# Patient Record
Sex: FEMALE | Race: WHITE | NOT HISPANIC OR LATINO | Employment: OTHER | URBAN - NONMETROPOLITAN AREA
[De-identification: names, ages, dates, MRNs, and addresses within clinical notes are randomized per-mention and may not be internally consistent; named-entity substitution may affect disease eponyms.]

---

## 2024-11-09 ENCOUNTER — OFFICE VISIT (OUTPATIENT)
Dept: URGENT CARE | Facility: CLINIC | Age: 79
End: 2024-11-09
Payer: COMMERCIAL

## 2024-11-09 VITALS
SYSTOLIC BLOOD PRESSURE: 124 MMHG | OXYGEN SATURATION: 98 % | TEMPERATURE: 98 F | HEART RATE: 98 BPM | DIASTOLIC BLOOD PRESSURE: 92 MMHG | RESPIRATION RATE: 18 BRPM

## 2024-11-09 DIAGNOSIS — H00.14 CHALAZION LEFT UPPER EYELID: Primary | ICD-10-CM

## 2024-11-09 PROCEDURE — S9088 SERVICES PROVIDED IN URGENT: HCPCS

## 2024-11-09 PROCEDURE — 99213 OFFICE O/P EST LOW 20 MIN: CPT

## 2024-11-09 RX ORDER — PANTOPRAZOLE SODIUM 40 MG/1
40 TABLET, DELAYED RELEASE ORAL DAILY
COMMUNITY
Start: 2024-05-31

## 2024-11-09 RX ORDER — TOBRAMYCIN AND DEXAMETHASONE 3; 1 MG/ML; MG/ML
1 SUSPENSION/ DROPS OPHTHALMIC EVERY 6 HOURS
Qty: 5 ML | Refills: 0 | Status: SHIPPED | OUTPATIENT
Start: 2024-11-09 | End: 2024-11-16

## 2024-11-09 RX ORDER — TRIAMTERENE AND HYDROCHLOROTHIAZIDE 37.5; 25 MG/1; MG/1
1 CAPSULE ORAL DAILY
COMMUNITY
Start: 2024-05-31

## 2024-11-09 RX ORDER — LORAZEPAM 1 MG/1
1 TABLET ORAL DAILY
COMMUNITY
Start: 2024-06-12

## 2024-11-09 NOTE — PROGRESS NOTES
"  [facility]        NAME: Chrissy Matson is a 79 y.o. female  : 1945    MRN: [id number]  DATE: 2024  TIME: 10:54 AM    Assessment and Plan   Chalazion left upper eyelid [H00.14]  1. Chalazion left upper eyelid  tobramycin-dexamethasone (TOBRADEX) ophthalmic suspension        Chalazion of left upper eyelid  Pt requesting abx. Reasonable given some erythema of conjunctiva and swelling. Supportive care recommended     Patient Instructions   Warm moist compress. Use the drops as directed. If no resolution in 1 month see eye doctor    Follow up with PCP in 3-5 days.  Proceed to  ER if symptoms worsen.    Chief Complaint     Chief Complaint   Patient presents with    Eye Pain     Left eye irritation onset 3 days ago ago tried \"sty drops\" without relief         History of Present Illness       Patient is a 79 y.o female who presents to the office today for a stye to the left upper lid. Has been using stye drops OTC without relief. Notes that her family doctor has given her oral abx in the past for the same. Denies visual changes. Does not wear contact lenses.     Eye Pain   Associated symptoms include eye redness and itching. Pertinent negatives include no eye discharge, fever or photophobia.       Review of Systems   Review of Systems   Constitutional:  Negative for chills and fever.   Eyes:  Positive for pain, redness and itching. Negative for photophobia, discharge and visual disturbance.   All other systems reviewed and are negative.        Current Medications       Current Outpatient Medications:     FLUoxetine (PROzac) 20 mg capsule, Take 1 tablet by mouth daily, Disp: , Rfl:     LORazepam (ATIVAN) 1 mg tablet, Take 1 mg by mouth daily, Disp: , Rfl:     pantoprazole (PROTONIX) 40 mg tablet, Take 40 mg by mouth daily, Disp: , Rfl:     tobramycin-dexamethasone (TOBRADEX) ophthalmic suspension, Administer 1 drop into the left eye every 6 (six) hours for 7 days, Disp: 5 mL, Rfl: 0    " triamterene-hydrochlorothiazide (DYAZIDE) 37.5-25 mg per capsule, Take 1 capsule by mouth daily, Disp: , Rfl:     Current Allergies     Allergies as of 11/09/2024    (No Known Allergies)            The following portions of the patient's history were reviewed and updated as appropriate: allergies, current medications, past family history, past medical history, past social history, past surgical history and problem list.     No past medical history on file.    No past surgical history on file.    No family history on file.      Medications have been verified.        Objective   /92   Pulse 98   Temp 98 °F (36.7 °C)   Resp 18   SpO2 98%        Physical Exam     Physical Exam  Vitals and nursing note reviewed.   Constitutional:       Appearance: Normal appearance. She is normal weight.   Eyes:      General:         Left eye: No foreign body or discharge.      Extraocular Movements:      Left eye: Normal extraocular motion and no nystagmus.      Conjunctiva/sclera:      Left eye: Left conjunctiva is injected. Chemosis present. No exudate or hemorrhage.     Pupils: Pupils are equal, round, and reactive to light.        Comments: Minimal erythema in periorbit area appears to be from irritation/rubbing   Cardiovascular:      Rate and Rhythm: Normal rate.   Pulmonary:      Effort: Pulmonary effort is normal.   Skin:     General: Skin is warm.      Capillary Refill: Capillary refill takes less than 2 seconds.   Neurological:      Mental Status: She is alert.